# Patient Record
Sex: FEMALE | Race: WHITE | NOT HISPANIC OR LATINO | Employment: UNEMPLOYED | URBAN - METROPOLITAN AREA
[De-identification: names, ages, dates, MRNs, and addresses within clinical notes are randomized per-mention and may not be internally consistent; named-entity substitution may affect disease eponyms.]

---

## 2024-08-26 ENCOUNTER — OFFICE VISIT (OUTPATIENT)
Dept: OBSTETRICS AND GYNECOLOGY | Facility: CLINIC | Age: 39
End: 2024-08-26
Payer: COMMERCIAL

## 2024-08-26 VITALS
SYSTOLIC BLOOD PRESSURE: 108 MMHG | DIASTOLIC BLOOD PRESSURE: 64 MMHG | BODY MASS INDEX: 25.89 KG/M2 | WEIGHT: 161.06 LBS | HEIGHT: 66 IN | HEART RATE: 94 BPM

## 2024-08-26 DIAGNOSIS — Z01.419 ENCNTR FOR GYN EXAM (GENERAL) (ROUTINE) W/O ABN FINDINGS: Primary | ICD-10-CM

## 2024-08-26 DIAGNOSIS — Z12.31 BREAST CANCER SCREENING BY MAMMOGRAM: ICD-10-CM

## 2024-08-26 DIAGNOSIS — Z12.4 SCREENING FOR CERVICAL CANCER: ICD-10-CM

## 2024-08-26 PROCEDURE — 99999 PR PBB SHADOW E&M-NEW PATIENT-LVL III: CPT | Mod: PBBFAC,,, | Performed by: STUDENT IN AN ORGANIZED HEALTH CARE EDUCATION/TRAINING PROGRAM

## 2024-08-26 PROCEDURE — 1159F MED LIST DOCD IN RCRD: CPT | Mod: CPTII,S$GLB,, | Performed by: STUDENT IN AN ORGANIZED HEALTH CARE EDUCATION/TRAINING PROGRAM

## 2024-08-26 PROCEDURE — 3078F DIAST BP <80 MM HG: CPT | Mod: CPTII,S$GLB,, | Performed by: STUDENT IN AN ORGANIZED HEALTH CARE EDUCATION/TRAINING PROGRAM

## 2024-08-26 PROCEDURE — 99385 PREV VISIT NEW AGE 18-39: CPT | Mod: S$GLB,,, | Performed by: STUDENT IN AN ORGANIZED HEALTH CARE EDUCATION/TRAINING PROGRAM

## 2024-08-26 PROCEDURE — 3008F BODY MASS INDEX DOCD: CPT | Mod: CPTII,S$GLB,, | Performed by: STUDENT IN AN ORGANIZED HEALTH CARE EDUCATION/TRAINING PROGRAM

## 2024-08-26 PROCEDURE — 1160F RVW MEDS BY RX/DR IN RCRD: CPT | Mod: CPTII,S$GLB,, | Performed by: STUDENT IN AN ORGANIZED HEALTH CARE EDUCATION/TRAINING PROGRAM

## 2024-08-26 PROCEDURE — 3074F SYST BP LT 130 MM HG: CPT | Mod: CPTII,S$GLB,, | Performed by: STUDENT IN AN ORGANIZED HEALTH CARE EDUCATION/TRAINING PROGRAM

## 2024-08-26 RX ORDER — LISDEXAMFETAMINE DIMESYLATE 40 MG/1
40 CAPSULE ORAL EVERY MORNING
COMMUNITY
Start: 2024-07-30

## 2024-08-26 NOTE — PROGRESS NOTES
Subjective:    Patient ID: Eliana Bradshaw is a 39 y.o. y.o. female.     Chief Complaint: Annual Well Woman Exam     History of Present Illness:  Eliana presents today for Annual Well Woman exam. She describes her menses as regular every month without intermenstrual spotting and every 30-35 days . She denies pelvic pain.  She denies breast tenderness, masses, nipple discharge. She denies difficulty with urination or bowel movements.  She is sexually active. Contraception is by no method.      Menstrual History:   Patient's last menstrual period was 2024..     OB History          0    Para   0    Term   0       0    AB   0    Living   0         SAB   0    IAB   0    Ectopic   0    Multiple   0    Live Births   0                 The following portions of the patient's history were reviewed and updated as appropriate: allergies, current medications, past family history, past medical history, past social history, past surgical history, and problem list.    ROS:   CONSTITUTIONAL: Negative for fever, chills, diaphoresis, weakness, fatigue, weight loss, weight gain  ENT: negative for sore throat, nasal congestion, nasal discharge, epistaxis, tinnitus, hearing loss  EYES: negative for blurry vision, decreased vision, loss of vision, eye pain, diplopia, photophobia, discharge  SKIN: Negative for rash, itching, hives  RESPIRATORY: negative for cough, hemoptysis, shortness of breath, pleuritic chest pain, wheezing  CARDIOVASCULAR: negative for chest pain, dyspnea on exertion, orthopnea, paroxysmal nocturnal dyspnea, edema, palpitations  BREAST: negative for breast  tenderness, breast mass, nipple discharge, or skin changes  GASTROINTESTINAL: negative for abdominal pain, flank pain, nausea, vomiting, diarrhea, constipation, black stool, blood in stool  GENITOURINARY: negative for abnormal vaginal bleeding, amenorrhea, decreased libido, dysuria, genital sores, hematuria, incontinence, menorrhagia,  pelvic pain, urinary frequency, vaginal discharge  HEMATOLOGIC/LYMPHATIC: negative for swollen lymph nodes, bleeding, bruising  MUSCULOSKELETAL: negative for back pain, joint pain, joint stiffness, joint swelling, muscle pain, muscle weakness  NEUROLOGICAL: negative for dizzy/vertigo, headache, focal weakness, numbness/tingling, speech problems, loss of consciousness, confusion, memory loss  BEHAVORIAL/PSYCH: negative for anxiety, depression, psychosis  ENDOCRINE: negative for polydipsia/polyuria, palpitations, skin changes, temperature intolerance, unexpected weight changes  ALLERGIC/IMMUNOLOGIC: negative for urticaria, hay fever, angioedema      Objective:    Vital Signs:  Vitals:    08/26/24 1205   BP: 108/64   Pulse: 94       Physical Exam:  General:  alert, cooperative, no distress   Skin:  Skin color, texture, turgor normal. No rashes or lesions   HEENT:  conjunctivae/corneas clear. PERRL.   Neck: supple, trachea midline, no adenopathy or thyromegally   Respiratory:  Normal effort   Breasts:  no discharge, erythema, tenderness, or palpable masses; no axillary lymphadenopathy   Abdomen:  soft, nontender, no palpable masses   Pelvis: External genitalia: normal general appearance  Urinary system: urethral meatus normal, bladder nontender  Vaginal: normal mucosa without prolapse or lesions  Cervix: normal appearance  Uterus: normal size, shape, position  Adnexa: normal size, nontender bilaterally   Extremities: Normal ROM; no edema, no cyanosis   Neurologial: Normal strength and tone. No focal numbness or weakness.   Psychiatric: normal mood, speech, dress, and thought processes       Assessment:       Healthy female exam.     1. Encntr for gyn exam (general) (routine) w/o abn findings    2. Screening for cervical cancer    3. Breast cancer screening by mammogram          Plan:      Problem List Items Addressed This Visit    None  Visit Diagnoses       Encntr for gyn exam (general) (routine) w/o abn findings    -   Primary    Screening for cervical cancer        Relevant Orders    Liquid-Based Pap Smear, Screening    HPV High Risk Genotypes, PCR    Breast cancer screening by mammogram        Relevant Orders    Mammo Digital Screening Bilat w/ Kory            COUNSELING:  Eliana was counseled on STD prevention, use and side-effects of various contraceptive measures, A.C.O.G. Pap guidelines and recommendations for yearly pelvic exams in addition to recommendations for monthly self breast exams; to see her PCP for other health maintenance.

## 2024-12-04 ENCOUNTER — ON-DEMAND VIRTUAL (OUTPATIENT)
Dept: URGENT CARE | Facility: CLINIC | Age: 39
End: 2024-12-04
Payer: COMMERCIAL

## 2024-12-04 VITALS — TEMPERATURE: 101 F

## 2024-12-04 DIAGNOSIS — J00 ACUTE NASOPHARYNGITIS: Primary | ICD-10-CM

## 2024-12-04 DIAGNOSIS — R50.9 FEVER, UNSPECIFIED FEVER CAUSE: ICD-10-CM

## 2024-12-04 DIAGNOSIS — R05.9 COUGH, UNSPECIFIED TYPE: ICD-10-CM

## 2024-12-04 RX ORDER — PROMETHAZINE HYDROCHLORIDE 6.25 MG/5ML
25 SYRUP ORAL EVERY 6 HOURS PRN
Qty: 120 ML | Refills: 0 | Status: SHIPPED | OUTPATIENT
Start: 2024-12-04

## 2024-12-04 RX ORDER — FLUTICASONE PROPIONATE 50 MCG
1 SPRAY, SUSPENSION (ML) NASAL DAILY
Qty: 16 G | Refills: 0 | Status: SHIPPED | OUTPATIENT
Start: 2024-12-04

## 2024-12-04 NOTE — PROGRESS NOTES
Subjective:      Patient ID: Eliana Bradshaw is a 39 y.o. female.    Vitals:  temperature is 101 °F (38.3 °C) (abnormal).     Chief Complaint: URI      Visit Type: TELE AUDIOVISUAL    Present with the patient at the time of consultation: TELEMED PRESENT WITH PATIENT: None    Past Medical History:   Diagnosis Date    Abnormal Pap smear of cervix     ADHD (attention deficit hyperactivity disorder)     Allergy     GERD (gastroesophageal reflux disease)     PCOS (polycystic ovarian syndrome)      Past Surgical History:   Procedure Laterality Date    WISDOM TOOTH EXTRACTION       Review of patient's allergies indicates:   Allergen Reactions    Corticosteroids (glucocorticoids)     Eggs [egg derived]     Latex      Current Outpatient Medications on File Prior to Visit   Medication Sig Dispense Refill    dextroamphetamine-amphetamine (ADDERALL XR) 10 MG 24 hr capsule Take 1 capsule (10 mg total) by mouth every morning. 30 capsule 0    lisdexamfetamine (VYVANSE) 40 MG Cap Take 40 mg by mouth every morning.       No current facility-administered medications on file prior to visit.     Family History   Adopted: Yes   Problem Relation Name Age of Onset    Breast cancer Neg Hx      Colon cancer Neg Hx      Ovarian cancer Neg Hx         Medications Ordered                [facility] [address] [place] [id number]    Telephone: [phone]   Fax: [phone]   Hours: Not open 24 hours                         E-Prescribed (2 of 2)              fluticasone propionate (FLONASE) 50 mcg/actuation nasal spray    Si spray (50 mcg total) by Each Nostril route once daily.       Start: 24     Quantity: 16 g Refills: 0                         promethazine (PHENERGAN) 6.25 mg/5 mL syrup    Sig: Take 20 mLs (25 mg total) by mouth every 6 (six) hours as needed (cough).       Start: 24     Quantity: 120 mL Refills: 0                           Ohs Peq Odvv Intake    2024   4:31 PM CST - Filed by Patient   What is your current physical address in the event of a medical emergency? [place] [address]   Are you able to take your vital signs? Yes   Systolic Blood Pressure:    Diastolic Blood Pressure:    Weight: 160   Height: 67   Pulse: 68   Temperature: 101   Respiration rate: 15.6   Pulse Oxygen: 99   Please attach any relevant images or files    Is your employer contracted with [facility]? No         Patient presents to the urgent care with complaints of nasal congestion, cough, body aches, and headache.  She has had a fever of 101-102 F. she visited her grandmother over the weekend who lives in a facility.  Patient has learned that many of the patients in the facility have similar symptoms.  Her grandmother was tested for COVID and influenza but the tests were negative.  She says that her grandmother was told she may have tested too early.  Patient's symptoms began Saturday however her fever just developed today.  She has been using [place] cold as well as Advil D.  Two patient identifiers were used-name was repeated verbally as well as date of birth.  She was located in her home in [place]          Constitution: Positive for activity change, appetite change, chills, fatigue and fever.   HENT:  Positive for congestion, postnasal drip, sinus pressure and sore throat.    Respiratory:  Positive for cough.         Objective:   The physical exam was conducted virtually.  Physical Exam   Constitutional: She is oriented to person, place, and time. No distress.   HENT:   Head: Normocephalic and atraumatic.   Neck: Neck supple.   Pulmonary/Chest: Effort normal. No respiratory distress.   Abdominal: Normal appearance.   Musculoskeletal: Normal range of motion.         General: Normal range of motion.   Neurological: no focal deficit. She is alert and oriented to person, place, and time.   Skin: Skin is not pale.   Psychiatric: Her behavior is normal. Mood,  judgment and thought content normal.       Assessment:     1. Acute nasopharyngitis    2. Fever, unspecified fever cause    3. Cough, unspecified type        Plan:       Acute nasopharyngitis    Fever, unspecified fever cause    Cough, unspecified type    Other orders  -     promethazine (PHENERGAN) 6.25 mg/5 mL syrup; Take 20 mLs (25 mg total) by mouth every 6 (six) hours as needed (cough).  Dispense: 120 mL; Refill: 0  -     fluticasone propionate (FLONASE) 50 mcg/actuation nasal spray; 1 spray (50 mcg total) by Each Nostril route once daily.  Dispense: 16 g; Refill: 0    Push fluids; tylenol or ibuprofen as needed for fever or discomfort.  F/u with PCP; to ER for worsening of symptoms.

## 2025-02-11 ENCOUNTER — LAB VISIT (OUTPATIENT)
Dept: LAB | Facility: HOSPITAL | Age: 40
End: 2025-02-11
Attending: ALLERGY & IMMUNOLOGY
Payer: COMMERCIAL

## 2025-02-11 ENCOUNTER — OFFICE VISIT (OUTPATIENT)
Dept: ALLERGY | Facility: CLINIC | Age: 40
End: 2025-02-11
Payer: COMMERCIAL

## 2025-02-11 VITALS
TEMPERATURE: 98 F | SYSTOLIC BLOOD PRESSURE: 95 MMHG | DIASTOLIC BLOOD PRESSURE: 67 MMHG | HEART RATE: 75 BPM | BODY MASS INDEX: 27.26 KG/M2 | WEIGHT: 168.88 LBS

## 2025-02-11 DIAGNOSIS — J32.9 RECURRENT SINUS INFECTIONS: ICD-10-CM

## 2025-02-11 DIAGNOSIS — Z91.040 LATEX ALLERGY: ICD-10-CM

## 2025-02-11 DIAGNOSIS — L71.9 ROSACEA: Primary | ICD-10-CM

## 2025-02-11 DIAGNOSIS — J31.0 RHINITIS, UNSPECIFIED TYPE: ICD-10-CM

## 2025-02-11 LAB
IGA SERPL-MCNC: 175 MG/DL (ref 40–350)
IGG SERPL-MCNC: 921 MG/DL (ref 650–1600)
IGM SERPL-MCNC: 80 MG/DL (ref 50–300)

## 2025-02-11 PROCEDURE — 86003 ALLG SPEC IGE CRUDE XTRC EA: CPT | Mod: 59 | Performed by: ALLERGY & IMMUNOLOGY

## 2025-02-11 PROCEDURE — 3074F SYST BP LT 130 MM HG: CPT | Mod: CPTII,S$GLB,, | Performed by: ALLERGY & IMMUNOLOGY

## 2025-02-11 PROCEDURE — 3078F DIAST BP <80 MM HG: CPT | Mod: CPTII,S$GLB,, | Performed by: ALLERGY & IMMUNOLOGY

## 2025-02-11 PROCEDURE — 82784 ASSAY IGA/IGD/IGG/IGM EACH: CPT | Performed by: ALLERGY & IMMUNOLOGY

## 2025-02-11 PROCEDURE — 82785 ASSAY OF IGE: CPT | Performed by: ALLERGY & IMMUNOLOGY

## 2025-02-11 PROCEDURE — 99999 PR PBB SHADOW E&M-EST. PATIENT-LVL III: CPT | Mod: PBBFAC,,, | Performed by: ALLERGY & IMMUNOLOGY

## 2025-02-11 PROCEDURE — 86317 IMMUNOASSAY INFECTIOUS AGENT: CPT | Performed by: ALLERGY & IMMUNOLOGY

## 2025-02-11 PROCEDURE — 1159F MED LIST DOCD IN RCRD: CPT | Mod: CPTII,S$GLB,, | Performed by: ALLERGY & IMMUNOLOGY

## 2025-02-11 PROCEDURE — 99204 OFFICE O/P NEW MOD 45 MIN: CPT | Mod: S$GLB,,, | Performed by: ALLERGY & IMMUNOLOGY

## 2025-02-11 PROCEDURE — 3008F BODY MASS INDEX DOCD: CPT | Mod: CPTII,S$GLB,, | Performed by: ALLERGY & IMMUNOLOGY

## 2025-02-11 PROCEDURE — 86003 ALLG SPEC IGE CRUDE XTRC EA: CPT | Performed by: ALLERGY & IMMUNOLOGY

## 2025-02-11 PROCEDURE — 36415 COLL VENOUS BLD VENIPUNCTURE: CPT | Performed by: ALLERGY & IMMUNOLOGY

## 2025-02-11 PROCEDURE — 86581 STRPTCS PNEUM ANTB SEROT IA: CPT | Performed by: ALLERGY & IMMUNOLOGY

## 2025-02-11 PROCEDURE — 86008 ALLG SPEC IGE RECOMB EA: CPT | Performed by: ALLERGY & IMMUNOLOGY

## 2025-02-11 RX ORDER — TIRZEPATIDE 10 MG/.5ML
10 INJECTION, SOLUTION SUBCUTANEOUS
COMMUNITY

## 2025-02-11 RX ORDER — BENZOYL PEROXIDE 50 MG/G
CREAM TOPICAL
COMMUNITY

## 2025-02-11 NOTE — PROGRESS NOTES
"Subjective:      Patient ID: Eliana Bradshaw is a 39 y.o. female.    Self referral    Chief Complaint:  Rash      HPI:  2/11/2025: 39 year old female with a history of Rosacea "we think"    No flare with NSAIDs    She would like to know, if something is triggering her symptoms.  NO flushing with red wine.    "Face feel irritated after eating seafood."    She denies anaphylaxis      Rash- face- intermittently occurs  Hibiclens improved symptoms  Epsolay- significantly improves, but does not completely resolve symptoms  Intermittent itching, discharge intermittently    She denies tongue or throat swelling.    Has not seen an Allergist.    She can not identify a food or exposure trigger for her Rosaceae.       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      Past Medical History:   Diagnosis Date    Abnormal Pap smear of cervix     ADHD (attention deficit hyperactivity disorder)     Allergy     GERD (gastroesophageal reflux disease)     PCOS (polycystic ovarian syndrome)         Family History   Adopted: Yes   Problem Relation Name Age of Onset    Breast cancer Neg Hx      Colon cancer Neg Hx      Ovarian cancer Neg Hx          Current Outpatient Medications on File Prior to Visit   Medication Sig Dispense Refill    benzoyl peroxide (EPSOLAY) 5 % Crea Apply topically.      dextroamphetamine-amphetamine (ADDERALL XR) 10 MG 24 hr capsule Take 1 capsule (10 mg total) by mouth every morning. 30 capsule 0    fluticasone propionate (FLONASE) 50 mcg/actuation nasal spray 1 spray (50 mcg total) by Each Nostril route once daily. 16 g 0    lisdexamfetamine " (VYVANSE) 40 MG Cap Take 40 mg by mouth every morning.      tirzepatide (MOUNJARO) 10 mg/0.5 mL PnIj Inject 10 mg into the skin every 7 days.      [DISCONTINUED] promethazine (PHENERGAN) 6.25 mg/5 mL syrup Take 20 mLs (25 mg total) by mouth every 6 (six) hours as needed (cough). 120 mL 0     No current facility-administered medications on file prior to visit.          Review of patient's allergies indicates:   Allergen Reactions    Corticosteroids (glucocorticoids)     Eggs [egg derived]     Latex             Environmental History: Pets in the home: dogs (2).  Tobacco Smoke in Home: no  Review of Systems   Constitutional:  Negative for chills and fever.   HENT:  Negative for congestion and rhinorrhea.    Eyes:  Negative for discharge.   Respiratory:  Negative for cough, shortness of breath and wheezing.    Skin:  Positive for rash.   Allergic/Immunologic: Positive for environmental allergies and food allergies. Negative for immunocompromised state.   Neurological:  Negative for facial asymmetry and speech difficulty.   Psychiatric/Behavioral:  Negative for behavioral problems and suicidal ideas.        Objective:   Physical Exam  Vitals reviewed.   Constitutional:       General: She is not in acute distress.     Appearance: Normal appearance. She is well-developed. She is not ill-appearing, toxic-appearing or diaphoretic.   HENT:      Head: Normocephalic and atraumatic.      Right Ear: Tympanic membrane, ear canal and external ear normal. There is no impacted cerumen.      Left Ear: Tympanic membrane, ear canal and external ear normal. There is no impacted cerumen.      Nose: Nose normal. No congestion or rhinorrhea.      Mouth/Throat:      Pharynx: No oropharyngeal exudate or posterior oropharyngeal erythema.   Eyes:      General: No scleral icterus.        Right eye: No discharge.         Left eye: No discharge.      Pupils: Pupils are equal, round, and reactive to light.   Neck:      Thyroid: No thyromegaly.    Cardiovascular:      Rate and Rhythm: Normal rate and regular rhythm.      Heart sounds: Normal heart sounds. No murmur heard.     No friction rub. No gallop.   Pulmonary:      Effort: Pulmonary effort is normal. No respiratory distress.      Breath sounds: Normal breath sounds. No stridor. No wheezing, rhonchi or rales.   Chest:      Chest wall: No tenderness.   Musculoskeletal:         General: No swelling, tenderness, deformity or signs of injury. Normal range of motion.      Cervical back: Normal range of motion and neck supple. No rigidity. No muscular tenderness.      Right lower leg: No edema.      Left lower leg: No edema.   Lymphadenopathy:      Cervical: No cervical adenopathy.   Skin:     General: Skin is warm.      Coloration: Skin is not jaundiced or pale.      Findings: Rash present. No bruising or erythema.      Comments: Mild erythema cheeks and forehead   Neurological:      Mental Status: She is alert and oriented to person, place, and time.      Gait: Gait normal.   Psychiatric:         Mood and Affect: Mood normal.         Behavior: Behavior normal.         Thought Content: Thought content normal.         Judgment: Judgment normal.       Unable to skin prick test, as she is taking antihistamines.      Assessment:      1. Rosacea    2. Latex allergy    3. Rhinitis, unspecified type    4. Recurrent sinus infections        Plan:       Rosacea  -     Allergen, Peanut Components IGE; Future; Expected date: 02/11/2025  -     Allergen, Milk Components IGE; Future; Expected date: 02/11/2025  -     Allergen, Egg Components IgE; Future; Expected date: 02/11/2025  -     Allergen Wheat IgE; Future; Expected date: 02/11/2025  -     Allergen Soybean IgE; Future; Expected date: 02/11/2025  -     Allergen Almonds IgE; Future; Expected date: 02/11/2025  -     Allergen Walnuts IgE; Future; Expected date: 02/11/2025  -     Allergen Sesame Seed IgE; Future; Expected date: 02/11/2025  -     Sunflower Seed IgE;  Future; Expected date: 02/11/2025  -     Allergen Pecan Nut IgE; Future; Expected date: 02/11/2025  -     Allergen Codfish IgE; Future; Expected date: 02/11/2025  -     Allergen Tuna IgE; Future; Expected date: 02/11/2025  -     Allergen-Catfish; Future; Expected date: 02/11/2025  -     Allergen Shrimp IgE; Future; Expected date: 02/11/2025  -     Lobster IgE; Future; Expected date: 02/11/2025  -     Allergen Crab IgE; Future; Expected date: 02/11/2025    Latex allergy  -     Rast Allergen-Latex; Future; Expected date: 02/11/2025    Rhinitis, unspecified type  -     Allergen Profile, Zone 6; Future; Expected date: 02/11/2025    Recurrent sinus infections  -     IMMUNOGLOBULINS (IGG, IGA, IGM) QUANTITATIVE; Future; Expected date: 02/11/2025  -     DIPHTHERIA / TETANUS ANTIBODY PANEL; Future; Expected date: 02/11/2025  -     Streptococcus pneumoniae IgG Antibody (23 Serotypes), MAID; Future; Expected date: 02/11/2025         She reports reactions to vaccine and has not had vaccine consistently as an adult.  Adopted and unsure of exactly what occurred with MMR vaccine as a child.      She took Covid J and J vaccine without symptoms.    Advised that Rosacea is not due to allergies. Offered testing that was ordered, at her request.      Offered patch testing, but she would like to have blood testing done first.      Recommend continued avoidance of latex and latex products.    Recommend avoidance of anything that she feels exacerbates her Rosacea.    RTC 4 weeks to discuss labs       SERA GRAY spent a total of 50 minutes on the day of the visit.This includes face to face time and non-face to face time preparing to see the patient (eg, review of tests), obtaining and/or reviewing separately obtained history, documenting clinical information in the electronic or other health record, independently interpreting results and communicating results to the patient/family/caregiver, or care coordinator.

## 2025-02-13 LAB
DIPHTHERIA IGG VALUE: 0.8 IU/ML
DIPHTHERIA TOXOID IGG ANTIBODY: POSITIVE
TETANUS TOXOID IGG AB: POSITIVE
TETANUS TOXOID IGG VALUE: >2.24 IU/ML

## 2025-02-14 ENCOUNTER — PATIENT MESSAGE (OUTPATIENT)
Dept: ALLERGY | Facility: CLINIC | Age: 40
End: 2025-02-14
Payer: COMMERCIAL

## 2025-02-14 LAB
IMMUNOLOGIST REVIEW: NORMAL
S PN DA SERO 19F IGG SER-MCNC: 1.7 MCG/ML
S PNEUM DA 1 IGG SER-MCNC: 1.2 MCG/ML
S PNEUM DA 10A IGG SER-MCNC: 0.9 MCG/ML
S PNEUM DA 11A IGG SER-MCNC: 0.4 MCG/ML
S PNEUM DA 12F IGG SER-MCNC: NORMAL MCG/ML
S PNEUM DA 14 IGG SER-MCNC: 2.7 MCG/ML
S PNEUM DA 15B IGG SER-MCNC: 1.4 MCG/ML
S PNEUM DA 17F IGG SER-MCNC: 0.6 MCG/ML
S PNEUM DA 18C IGG SER-MCNC: 0.4 MCG/ML
S PNEUM DA 19A IGG SER-MCNC: NORMAL MCG/ML
S PNEUM DA 2 IGG SER-MCNC: 0.6 MCG/ML
S PNEUM DA 20A IGG SER-MCNC: 2.3 MCG/ML
S PNEUM DA 22F IGG SER-MCNC: 1 MCG/ML
S PNEUM DA 23F IGG SER-MCNC: 1 MCG/ML
S PNEUM DA 3 IGG SER-MCNC: 0.2 MCG/ML
S PNEUM DA 33F IGG SER-MCNC: 2 MCG/ML
S PNEUM DA 4 IGG SER-MCNC: 0.1 MCG/ML
S PNEUM DA 5 IGG SER-MCNC: 0.4 MCG/ML
S PNEUM DA 6B IGG SER-MCNC: 1.3 MCG/ML
S PNEUM DA 7F IGG SER-MCNC: 0.8 MCG/ML
S PNEUM DA 8 IGG SER-MCNC: 4.4 MCG/ML
S PNEUM DA 9N IGG SER-MCNC: 0.5 MCG/ML
S PNEUM DA 9V IGG SER-MCNC: 0.2 MCG/ML

## 2025-02-17 ENCOUNTER — RESULTS FOLLOW-UP (OUTPATIENT)
Dept: ALLERGY | Facility: CLINIC | Age: 40
End: 2025-02-17

## 2025-02-17 LAB
A-LACTALB IGE QN: <0.1 KU/L
ALLERGEN LATEX IGE: <0.1 KU/L
ALMOND IGE QN: <0.1 KU/L
B-LACTALB IGE QN: <0.1 KU/L
CASEIN IGE QN: <0.1 KU/L
CATFISH IGE QN: <0.1 KU/L
CODFISH IGE QN: <0.1 KU/L
COW MILK IGE QN: <0.1 KU/L
CRAB IGE QN: <0.1 KU/L
DEPRECATED CASEIN IGE RAST QL: NORMAL
EGG WHITE IGE QN: <0.1 KU/L
EGG YOLK IGE QN: <0.1 KU/L
LOBSTER IGE QN: <0.1 KU/L
OVALB IGE QN: <0.1 KU/L
OVOMUCOID IGE QN: <0.1 KU/L
PECAN/HICK NUT IGE QN: <0.1 KU/L
RAST CLASS: NORMAL
SESAME SEED IGE QN: <0.1 KU/L
SHRIMP IGE QN: <0.1 KU/L
SOYBEAN IGE QN: <0.1 KU/L
SUNFLOWER SEED IGE QN: <0.1 KU/L
TUNA IGE QN: <0.1 KU/L
WALNUT IGE QN: <0.1 KU/L
WHEAT IGE QN: <0.1 KU/L

## 2025-02-19 LAB
A ALTERNATA IGE QN: <0.1 KU/L
A FUMIGATUS IGE QN: <0.1 KU/L
ALLERGEN BOXELDER MAPLE TREE IGE: <0.1 KU/L
ALLERGEN MULBERRY TREE IGE: <0.1 KU/L
ALLERGEN PIGWEED IGE: <0.1 KU/L
ALLERGEN WALNUT TREE IGE: <0.1 KU/L
ALLERGY INTERPRETATION: NORMAL
BERMUDA GRASS IGE QN: <0.1 KU/L
C HERBARUM IGE QN: <0.1 KU/L
CAT DANDER IGE QN: <0.1 KU/L
COMMON RAGWEED IGE QN: <0.1 KU/L
D FARINAE IGE QN: <0.1 KU/L
D PTERONYSS IGE QN: <0.1 KU/L
DEPRECATED PEANUT (RARA H) 2 IGE RAST QL: NORMAL
DEPRECATED PEANUT (RARA H) 2 IGE RAST QL: NORMAL
DEPRECATED PEANUT (RARA H) 3 IGE RAST QL: NORMAL
DEPRECATED PEANUT (RARA H) 6 IGE RAST QL: NORMAL
DEPRECATED PEANUT (RARA H) 8 IGE RAST QL: NORMAL
DEPRECATED TIMOTHY IGE RAST QL: NORMAL
DOG DANDER IGE QN: <0.1 KU/L
ELDER IGE QN: <0.1 KU/L
IGE: 3.1 IU/ML
MOUSE URINE PROT IGE QN: <0.1 KU/L
MT JUNIPER IGE QN: <0.1 KU/L
P NOTATUM IGE QN: <0.1 KU/L
PEANUT (RARA H) 1 IGE QN: <0.1 KU/L
PEANUT (RARA H) 2 IGE QN: <0.1 KU/L
PEANUT (RARA H) 3 IGE QN: <0.1 KU/L
PEANUT (RARA H) 6 IGE QN: <0.1 KU/L
PEANUT (RARA H) 8 IGE QN: <0.1 KU/L
PEANUT (RARA H) 9 IGE QN: <0.1 KU/L
PEANUT (RARA H) 9 IGE QN: NORMAL
PECAN/HICK TREE IGE QN: <0.1 KU/L
RAST ALLERGEN INTERPRETATION: NORMAL
RAST CLASS: NORMAL
ROACH IGE QN: <0.1 KU/L
SILVER BIRCH IGE QN: <0.1 KU/L
TIMOTHY IGE QN: <0.1 KU/L
WHITE ELM IGE QN: <0.1 KU/L
WHITE OAK IGE QN: <0.1 KU/L

## 2025-03-05 ENCOUNTER — PATIENT MESSAGE (OUTPATIENT)
Dept: ALLERGY | Facility: CLINIC | Age: 40
End: 2025-03-05
Payer: COMMERCIAL

## 2025-04-08 ENCOUNTER — TELEPHONE (OUTPATIENT)
Dept: ALLERGY | Facility: CLINIC | Age: 40
End: 2025-04-08

## 2025-04-08 ENCOUNTER — PATIENT MESSAGE (OUTPATIENT)
Dept: ALLERGY | Facility: CLINIC | Age: 40
End: 2025-04-08

## 2025-04-08 ENCOUNTER — OFFICE VISIT (OUTPATIENT)
Dept: ALLERGY | Facility: CLINIC | Age: 40
End: 2025-04-08
Payer: COMMERCIAL

## 2025-04-08 DIAGNOSIS — L71.9 ROSACEA: ICD-10-CM

## 2025-04-08 DIAGNOSIS — Z91.040 LATEX ALLERGY: ICD-10-CM

## 2025-04-08 DIAGNOSIS — J32.9 RECURRENT SINUS INFECTIONS: ICD-10-CM

## 2025-04-08 DIAGNOSIS — J31.0 NON-ALLERGIC RHINITIS: Primary | ICD-10-CM

## 2025-04-08 NOTE — PROGRESS NOTES
"Subjective:      Patient ID: Eliana Bradshaw is a 39 y.o. female.      The patient location is: [place]   The chief complaint leading to consultation is:  Follow up    Visit type: audiovisual    Face to Face time with patient: 33 minutes of total time spent on the encounter, which includes face to face time and non-face to face time preparing to see the patient (eg, review of tests), Obtaining and/or reviewing separately obtained history, Documenting clinical information in the electronic or other health record, Independently interpreting results (not separately reported) and communicating results to the patient/family/caregiver, or Care coordination (not separately reported).         Each patient to whom he or she provides medical services by telemedicine is:  (1) informed of the relationship between the physician and patient and the respective role of any other health care provider with respect to management of the patient; and (2) notified that he or she may decline to receive medical services by telemedicine and may withdraw from such care at any time.    Notes:          Chief Complaint:  Follow-up      HPI:     4/8/2025: 39 year old female- follow up  Not taking Zyrtec. [place] mule yesterday and face became red, hives  Took allegra hives and symptoms improved         2/11/2025: 39 year old female with a history of Rosacea "we think"    No flare with NSAIDs    She would like to know, if something is triggering her symptoms.  NO flushing with red wine.    "Face feel irritated after eating seafood."    She denies anaphylaxis      Rash- face- intermittently occurs  Hibiclens improved symptoms  Epsolay- significantly improves, but does not completely resolve symptoms  Intermittent itching, discharge intermittently    She denies tongue or throat swelling.    Has not seen an Allergist.    She can not identify a food or exposure trigger for her Rosaceae.       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      Past Medical History:   Diagnosis Date    Abnormal Pap smear of cervix     ADHD (attention deficit hyperactivity disorder)     Allergy     GERD (gastroesophageal reflux disease)     PCOS (polycystic ovarian syndrome)         Family History   Adopted: Yes   Problem Relation Name Age of Onset    Breast cancer Neg Hx      Colon cancer Neg Hx      Ovarian cancer Neg Hx          Current Outpatient Medications on File Prior to Visit   Medication Sig Dispense Refill    benzoyl peroxide (EPSOLAY) 5 % Crea Apply topically.      dextroamphetamine-amphetamine (ADDERALL XR) 10 MG 24 hr capsule Take 1 capsule (10 mg total) by mouth every morning. 30 capsule 0    fluticasone propionate (FLONASE) 50 mcg/actuation nasal spray 1 spray (50 mcg total) by Each Nostril route once daily. 16 g 0    lisdexamfetamine (VYVANSE) 40 MG Cap Take 40 mg by mouth every morning.      tirzepatide (MOUNJARO) 10 mg/0.5 mL PnIj Inject 10 mg into the skin every 7 days.       No current facility-administered medications on file prior to visit.          Review of patient's allergies indicates:   Allergen Reactions    Corticosteroids (glucocorticoids)     Eggs [egg derived]     Latex             Environmental History: Pets in the home: dogs (2).  Tobacco Smoke in Home: no  Review of Systems   Constitutional:  Negative for chills and fever.   HENT:  Negative for congestion and rhinorrhea.    Eyes:  Negative for discharge.   Respiratory:  Negative for cough, shortness of breath and wheezing.    Skin:  Positive for rash.   Allergic/Immunologic: Positive for environmental allergies " and food allergies. Negative for immunocompromised state.   Neurological:  Negative for facial asymmetry and speech difficulty.   Psychiatric/Behavioral:  Negative for behavioral problems and suicidal ideas.        Objective:       Alert and oriented in NAD  Speaking in complete sentences      Sub optimal pneumococcal titers  Food allergy testing and latex IgE were negative    Assessment:      1. Non-allergic rhinitis    2. Latex allergy    3. Recurrent sinus infections    4. Rosacea          Plan:       Non-allergic rhinitis    Latex allergy    Recurrent sinus infections    Rosacea      Advised that no allergy test exist for alcohol, but she may have a sulfite allergy. I recommend avoiding sulfite containing products.    She reports reactions to vaccine and has not had vaccine consistently as an adult.  Adopted and unsure of exactly what occurred with MMR vaccine as a child.    Recommend she take the PPSV23 in our clinic. Recheck titers 4 weeks post.    She took Covid J and J vaccine without symptoms.    Recommend continued avoidance of latex and latex products.    Recommend avoidance of anything that she feels exacerbates her Rosacea.    RTC 4 weeks        SERA GRAY    I spent a total of 41 minutes on the day of the visit.This includes face to face time and non-face to face time preparing to see the patient (eg, review of tests), obtaining and/or reviewing separately obtained history, documenting clinical information in the electronic or other health record, independently interpreting results and communicating results to the patient/family/caregiver, or care coordinator.